# Patient Record
(demographics unavailable — no encounter records)

---

## 2024-10-16 NOTE — CHIEF COMPLAINT
[FreeTextEntry1] : Roslyn is a 15 y/o G0 who presents today with c/o new onset pain with sex s of the past 2 months. She has been sexually active for the past year.   She is sexually active, using birth control pills only and no condoms. Discussed the importance of condom usage in the protection against STDs and HPV. Pt unsure if she ever received Gardasil vaccine.

## 2024-10-16 NOTE — PHYSICAL EXAM
[Labia Majora] : labia major [Labia Minora] : labia minora [Normal] : clitoris [FreeTextEntry4] : moderate thin white vaginal discharge

## 2024-10-16 NOTE — HISTORY OF PRESENT ILLNESS
[Definite:  ___ (Date)] : the last menstrual period was [unfilled] [Sexually Active] : is sexually active [Contraception] : uses contraception [de-identified] : COCs only, no condoms